# Patient Record
Sex: FEMALE | Race: WHITE | NOT HISPANIC OR LATINO
[De-identification: names, ages, dates, MRNs, and addresses within clinical notes are randomized per-mention and may not be internally consistent; named-entity substitution may affect disease eponyms.]

---

## 2021-05-19 PROBLEM — Z00.00 ENCOUNTER FOR PREVENTIVE HEALTH EXAMINATION: Status: ACTIVE | Noted: 2021-05-19

## 2021-05-20 ENCOUNTER — APPOINTMENT (OUTPATIENT)
Dept: OBGYN | Facility: CLINIC | Age: 51
End: 2021-05-20

## 2021-11-03 ENCOUNTER — NON-APPOINTMENT (OUTPATIENT)
Age: 51
End: 2021-11-03

## 2021-11-03 ENCOUNTER — APPOINTMENT (OUTPATIENT)
Dept: OBGYN | Facility: CLINIC | Age: 51
End: 2021-11-03
Payer: COMMERCIAL

## 2021-11-03 VITALS
TEMPERATURE: 97.2 F | SYSTOLIC BLOOD PRESSURE: 131 MMHG | HEART RATE: 88 BPM | WEIGHT: 175 LBS | BODY MASS INDEX: 29.88 KG/M2 | DIASTOLIC BLOOD PRESSURE: 87 MMHG | HEIGHT: 64 IN

## 2021-11-03 PROCEDURE — 99386 PREV VISIT NEW AGE 40-64: CPT

## 2021-11-03 NOTE — HISTORY OF PRESENT ILLNESS
[FreeTextEntry1] : here for routine visit\par last visit almost 4 years ago:\par \par Patient\par Name	JADIEL EDWARDS (48yo, F) ID# [id number]	Appt. Date/Time	2018 12:15PM\par 	1970	Service Dept.	[facility] OFF[facility]\par Provider	DEEP DUNN MD\par Insurance	\par Med Primary: ALLIED - [employer] (PPO)\par Insurance # : [id number]\par Prescription: DSTPS - Member is eligible. details\par Chief Complaint\par SONOGRAM, check on cyst\par Patient's Care Team\par Primary Care Provider: HOLDEN RITTER MD FACP: [address] [place] [id number], Ph [phone], Fax [phone] NPI: [id number]\par Patient's Pharmacies\par [id number] [address] [place] [id number], Ph [phone], Fax [phone]\par [id number] ([place]): [address] [place] [address], Ph [phone], Fax [phone]\par Vitals\par 2018 12:34 pm\par Ht:	5 ft 4 in\par BP:	120/78 sitting\par Allergies\par Reviewed Allergies\par NKDA\par Medications\par Reviewed Medications\par ALPRAZolam 0.5 mg tablet\par 14   filled	[facility]\par amoxicillin 875 mg-potassium clavulanate 125 mg tablet\par 14   filled	[facility]\par azithromycin 250 mg tablet\par 18   filled	[facility]\par brompheniramine-pseudoephedrine-DM 2 mg-30 mg-10 mg/5 mL syrup\par 18   filled	[facility]\par buPROPion HCl  mg 24 hr tablet, extended release\par 04/20/15   filled	[facility]\par Cheratussin AC 10 mg-100 mg/5 mL oral liquid\par 14   filled	[facility]\par hydroCHLOROthiazide 25 mg tablet\par 18   filled	[facility]\par hydrocodone 10 mg-chlorpheniramine 8 mg/5 mL oral susp extend.rel 12hr\par 18   filled	[facility]\par meloxicam 15 mg tablet\par 14   filled	[facility]\par Necon 1/35 (28) 1 mg-35 mcg tablet\par Take 1 tablet(s) every day by oral route for 28 days.\par 05/12/15   prescribed	Deep Dunn MD\par oseltamivir 75 mg capsule\par 18   filled	[facility]\par ProAir HFA 90 mcg/actuation aerosol inhaler\par 14   filled	[facility]\par valsartan 80 mg tablet\par 18   filled	[facility]\par Problems\par Reviewed Problems\par Pain in pelvis - Onset: 2017\par Amenorrhea - Onset: 2017\par Family history of neoplasm of breast - Onset: 2017\par Female genital organ symptoms\par Cyst of ovary\par Irregular periods\par Gynecologic examination\par Family History\par Reviewed Family History\par Mother	- Malignant tumor of breast ( age: 57)\par - needs breast specialist (previously recorded as Breast Cancer)\par Paternal Aunt	- Malignant tumor of ovary\par Father	- Heart disease\par - 2 STENTS\par Social History\par Reviewed Social History\par Smoking Status: Never smoker\par Surgical History\par Reviewed Surgical History\par Caesarean Section - 2\par Endometrial Ablation\par Tubal Ligation\par GYN History\par Reviewed GYN History\par LMP: Approximate.\par Date of LMP: 2018 (Notes: end of 2017 into 2017).\par Obstetric History\par Reviewed Obstetric History\par TOTAL	FULL	PRE	AB. I	AB. S	ECTOPICS	MULTIPLE	LIVING\par 2	2						2\par 2 cs\par \par Past Medical History\par Reviewed Past Medical History\par High Blood Pressure: Y\par Documents for Discussion\par Discussed the following documents:\par US, TRANSVAGINAL - 18\par \par Result Note: cyst gone\par Screening\par None recorded.\par HPI\par routine gyn exam\par ROS\par Patient reports no fatigue, no fever, no significant weight gain, and no significant weight loss. She reports no abnormal moles and no rashes. She reports no irritation and no vision changes. She reports no hearing loss, no ear pain, no nose/sinus problems, no sore throat, no snoring, no dry mouth, and no mouth ulcers. She reports no dyspnea / shortness of breath, no cough, no sputum production, no hemoptysis, and no wheezing. She reports no chest pain, no palpitations, and no orthopnea. She reports no heartburn, no dysphagia, no nausea, no vomiting, no abdominal pain, no bowel movement changes, no diarrhea, no constipation, and no rectal bleeding. She reports no hematuria, no abnormal bleeding, no flank pain, no trouble urinating, no incontinence, no rash, no lesion, no discharge, no vaginal odor, and no vaginal itching. She reports no menstrual problems and no PMDD symptoms. She reports no menopausal symptoms. She reports no sexual problems. She reports no muscle aches, no muscle weakness, no arthralgias/joint pain, and no back pain. She reports no headaches, no dizziness, no LOC, no weakness, no numbness, and no seizures. She reports no depression, no alcoholism, and no sleep disturbances.\par Physical Exam\par Patient is a 47-year-old female.\par \par Female Genitalia: Vulva: no masses, atrophy, or lesions. Bladder/Urethra: no urethral discharge or mass and normal meatus and bladder non distended. Vagina no tenderness, erythema, cystocele, rectocele, abnormal vaginal discharge, or vesicle(s) or ulcers. Cervix: no discharge or cervical motion tenderness and grossly normal. Uterus: normal size and shape and midline, mobile, non-tender, and no uterine prolapse. Adnexa/Parametria: no parametrial tenderness or mass and no adnexal tenderness or ovarian mass.\par Assessment / Plan\par 1. Family history of neoplasm of breast -\par MAMMO/SONO?\par \par Z84.89: Family history of other specified conditions\par PREGNANCY TEST, URINE\par BREAST SURGERY REFERRAL -     Schedule Within: provider's discretion\par \par 2. Cyst of left ovary -\par : NEGATIVE,\par REPEAT SONO 3 MTHS today...cyst gone\par \par N83.202: Unspecified ovarian cyst, left side\par US, TRANSVAGINAL\par \par US, TRANSVAGINAL\par Result Note: cyst gone\par Review of us, transvaginal taken on 2018 at IN-OFFICE ORDER shows:\par Imaging Studies:\par Indications: ovarian cyst.\par Uterus: volume (cc): 156.\par Endometrium: thickness 45 mm.\par Cervix: normal.\par Cul de sac: no fluid was demonstrated.\par Right Ovary: volume (cc): 1.9.\par Left Ovary: volume (cc): 1.7.\par \par Return to Office\par None recorded.\par Encounter Sign-Off\par Encounter signed-off by Deep Dunn MD, 2018.\par Encounter performed and documented by Deep Dunn MD\par Encounter reviewed & signed by Deep Dunn MD on 2018 at 12:56pm\par Audit history\par

## 2021-11-12 LAB
C TRACH RRNA SPEC QL NAA+PROBE: NOT DETECTED
HPV HIGH+LOW RISK DNA PNL CVX: NOT DETECTED
N GONORRHOEA RRNA SPEC QL NAA+PROBE: NOT DETECTED
SOURCE AMPLIFICATION: NORMAL

## 2021-11-25 LAB — CYTOLOGY CVX/VAG DOC THIN PREP: ABNORMAL

## 2021-11-29 ENCOUNTER — NON-APPOINTMENT (OUTPATIENT)
Age: 51
End: 2021-11-29

## 2022-01-18 ENCOUNTER — APPOINTMENT (OUTPATIENT)
Dept: OBGYN | Facility: CLINIC | Age: 52
End: 2022-01-18
Payer: COMMERCIAL

## 2022-01-18 VITALS
BODY MASS INDEX: 30.73 KG/M2 | HEIGHT: 64 IN | WEIGHT: 180 LBS | DIASTOLIC BLOOD PRESSURE: 80 MMHG | SYSTOLIC BLOOD PRESSURE: 126 MMHG | TEMPERATURE: 97.3 F | HEART RATE: 78 BPM

## 2022-01-18 PROCEDURE — 57452 EXAM OF CERVIX W/SCOPE: CPT

## 2022-01-18 NOTE — PROCEDURE
[Colposcopy] : Colposcopy  [Time out performed] : Pre-procedure time out performed.  Patient's name, date of birth and procedure confirmed. [Consent Obtained] : Consent obtained [Risks] : risks [Benefits] : benefits [Alternatives] : alternatives [Patient] : patient [Infection] : infection [Bleeding] : bleeding [Allergic Reaction] : allergic reaction [LGSIL] : LGSIL [No Premedication] : no premedication [No Abnormalities] : no abnormalities [Tolerated Well] : the patient tolerated the procedure well [de-identified] : colposcopy inadequate due to cervix pulled way up from her 2 csections....could not visualize it adequately...will need to be done under sedation [de-identified] : set up colpo with sedation

## 2022-01-27 ENCOUNTER — NON-APPOINTMENT (OUTPATIENT)
Age: 52
End: 2022-01-27

## 2022-02-01 DIAGNOSIS — Z01.818 ENCOUNTER FOR OTHER PREPROCEDURAL EXAMINATION: ICD-10-CM

## 2022-02-08 ENCOUNTER — APPOINTMENT (OUTPATIENT)
Dept: OBGYN | Facility: CLINIC | Age: 52
End: 2022-02-08
Payer: COMMERCIAL

## 2022-02-08 VITALS
BODY MASS INDEX: 32.61 KG/M2 | SYSTOLIC BLOOD PRESSURE: 110 MMHG | WEIGHT: 191 LBS | TEMPERATURE: 98.8 F | HEIGHT: 64 IN | DIASTOLIC BLOOD PRESSURE: 75 MMHG

## 2022-02-08 PROCEDURE — 57454 BX/CURETT OF CERVIX W/SCOPE: CPT

## 2022-02-08 NOTE — PROCEDURE
[Colposcopy] : Colposcopy  [Time out performed] : Pre-procedure time out performed.  Patient's name, date of birth and procedure confirmed. [Consent Obtained] : Consent obtained [Risks] : risks [Benefits] : benefits [Alternatives] : alternatives [Patient] : patient [Infection] : infection [Bleeding] : bleeding [Allergic Reaction] : allergic reaction [LGSIL] : LGSIL [No Premedication] : no premedication [Colposcopy Adequate] : colposcopy adequate [SCI Fully Visualized] : SCI fully visualized [ECC Performed] : ECC performed [No Abnormalities] : no abnormalities [Lesion] : lesion seen [Hemostasis Obtained] : Hemostasis obtained [Tolerated Well] : the patient tolerated the procedure well [de-identified] : we [de-identified] : 9/12/ecc [de-identified] : punch and ecc

## 2022-02-09 ENCOUNTER — NON-APPOINTMENT (OUTPATIENT)
Age: 52
End: 2022-02-09

## 2022-02-12 LAB — CORE LAB BIOPSY: NORMAL

## 2022-02-18 ENCOUNTER — NON-APPOINTMENT (OUTPATIENT)
Age: 52
End: 2022-02-18

## 2022-08-17 ENCOUNTER — APPOINTMENT (OUTPATIENT)
Dept: OBGYN | Facility: CLINIC | Age: 52
End: 2022-08-17

## 2022-08-17 ENCOUNTER — TRANSCRIPTION ENCOUNTER (OUTPATIENT)
Age: 52
End: 2022-08-17

## 2022-08-17 VITALS
TEMPERATURE: 98.8 F | SYSTOLIC BLOOD PRESSURE: 126 MMHG | DIASTOLIC BLOOD PRESSURE: 83 MMHG | BODY MASS INDEX: 30.73 KG/M2 | HEART RATE: 98 BPM | HEIGHT: 64 IN | WEIGHT: 180 LBS

## 2022-08-17 DIAGNOSIS — N91.2 AMENORRHEA, UNSPECIFIED: ICD-10-CM

## 2022-08-17 PROCEDURE — 99213 OFFICE O/P EST LOW 20 MIN: CPT

## 2022-08-25 ENCOUNTER — NON-APPOINTMENT (OUTPATIENT)
Age: 52
End: 2022-08-25

## 2022-08-25 LAB — HPV HIGH+LOW RISK DNA PNL CVX: NOT DETECTED

## 2022-09-06 LAB — CYTOLOGY CVX/VAG DOC THIN PREP: NORMAL

## 2022-11-09 ENCOUNTER — APPOINTMENT (OUTPATIENT)
Dept: OBGYN | Facility: CLINIC | Age: 52
End: 2022-11-09

## 2023-02-08 ENCOUNTER — APPOINTMENT (OUTPATIENT)
Dept: OBGYN | Facility: CLINIC | Age: 53
End: 2023-02-08
Payer: COMMERCIAL

## 2023-02-08 VITALS
BODY MASS INDEX: 30.73 KG/M2 | SYSTOLIC BLOOD PRESSURE: 124 MMHG | HEIGHT: 64 IN | DIASTOLIC BLOOD PRESSURE: 84 MMHG | WEIGHT: 180 LBS | HEART RATE: 80 BPM

## 2023-02-08 PROCEDURE — 99396 PREV VISIT EST AGE 40-64: CPT

## 2023-02-11 LAB — HPV HIGH+LOW RISK DNA PNL CVX: NOT DETECTED

## 2023-02-13 ENCOUNTER — NON-APPOINTMENT (OUTPATIENT)
Age: 53
End: 2023-02-13

## 2023-02-19 LAB — CYTOLOGY CVX/VAG DOC THIN PREP: NORMAL

## 2023-03-15 ENCOUNTER — NON-APPOINTMENT (OUTPATIENT)
Age: 53
End: 2023-03-15

## 2024-02-14 ENCOUNTER — APPOINTMENT (OUTPATIENT)
Dept: OBGYN | Facility: CLINIC | Age: 54
End: 2024-02-14
Payer: COMMERCIAL

## 2024-02-14 VITALS
BODY MASS INDEX: 33.8 KG/M2 | WEIGHT: 198 LBS | HEART RATE: 83 BPM | HEIGHT: 64 IN | SYSTOLIC BLOOD PRESSURE: 122 MMHG | DIASTOLIC BLOOD PRESSURE: 82 MMHG

## 2024-02-14 DIAGNOSIS — R87.612 LOW GRADE SQUAMOUS INTRAEPITHELIAL LESION ON CYTOLOGIC SMEAR OF CERVIX (LGSIL): ICD-10-CM

## 2024-02-14 DIAGNOSIS — Z91.89 OTHER SPECIFIED PERSONAL RISK FACTORS, NOT ELSEWHERE CLASSIFIED: ICD-10-CM

## 2024-02-14 DIAGNOSIS — Z01.419 ENCOUNTER FOR GYNECOLOGICAL EXAMINATION (GENERAL) (ROUTINE) W/OUT ABNORMAL FINDINGS: ICD-10-CM

## 2024-02-14 LAB
BILIRUB UR QL STRIP: NEGATIVE
CLARITY UR: CLEAR
COLLECTION METHOD: NORMAL
GLUCOSE UR-MCNC: NEGATIVE
HCG UR QL: 0.2 EU/DL
HGB UR QL STRIP.AUTO: NEGATIVE
KETONES UR-MCNC: NEGATIVE
LEUKOCYTE ESTERASE UR QL STRIP: NEGATIVE
NITRITE UR QL STRIP: NEGATIVE
PH UR STRIP: 7
PROT UR STRIP-MCNC: NEGATIVE
SP GR UR STRIP: 1.01

## 2024-02-14 PROCEDURE — 99396 PREV VISIT EST AGE 40-64: CPT | Mod: 25

## 2024-02-14 PROCEDURE — 81003 URINALYSIS AUTO W/O SCOPE: CPT | Mod: QW

## 2024-02-14 RX ORDER — HYDROCHLOROTHIAZIDE 12.5 MG/1
TABLET ORAL
Refills: 0 | Status: ACTIVE | COMMUNITY

## 2024-02-14 RX ORDER — METFORMIN HYDROCHLORIDE 625 MG/1
TABLET ORAL
Refills: 0 | Status: ACTIVE | COMMUNITY

## 2024-02-14 RX ORDER — ROSUVASTATIN CALCIUM 5 MG/1
TABLET, FILM COATED ORAL
Refills: 0 | Status: ACTIVE | COMMUNITY

## 2024-02-14 RX ORDER — TIRZEPATIDE 7.5 MG/.5ML
INJECTION, SOLUTION SUBCUTANEOUS
Refills: 0 | Status: ACTIVE | COMMUNITY

## 2024-02-17 LAB — HPV HIGH+LOW RISK DNA PNL CVX: NOT DETECTED

## 2024-02-23 LAB — CYTOLOGY CVX/VAG DOC THIN PREP: NORMAL

## 2024-04-23 ENCOUNTER — NON-APPOINTMENT (OUTPATIENT)
Age: 54
End: 2024-04-23

## 2024-05-14 ENCOUNTER — APPOINTMENT (OUTPATIENT)
Dept: HEMATOLOGY ONCOLOGY | Facility: CLINIC | Age: 54
End: 2024-05-14

## 2024-05-20 NOTE — DISCUSSION/SUMMARY
[FreeTextEntry1] : The visit was provided via telehealth using real-time 2-way audio visual technology. The patient, Taylor Duncan, was located in [place] at the time of the visit. The provider, Brandy Renteria (genetic counselor) participated in the telehealth encounter. The genetic counselor was located in [place], [place] at the time of the appointment. Verbal consent for telehealth services was given by the patient. The patient was unaccompanied.  REASON FOR VISIT: Ms. Taylor Duncan is a 53-year-old female who was referred by Dr. Deep Mistry for cancer genetic counseling and risk assessment due to a family history of cancer. The patient was seen on 2024 through [facility]. The patient was unaccompanied.   RELEVANT MEDICAL AND SURGICAL HISTORY: The patient reported no personal history of cancer.   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 Frequency: annual. No prior biopsies. GYN: Last visit 2024. Frequency: annual. Patient reported no abnormalities. Colon: Denied  Skin: Denied    SOCIAL HISTORY:  Tobacco-product use: Denied    FAMILY HISTORY: Paternal ancestry was reported as Stateless and maternal ancestry was reported as Stateless. A detailed family history of cancer was ascertained, see below for pedigree. Of note: - Mother with breast cancer at 58 -  Paternal aunt with ovarian cancer in her late 60s -  Paternal uncle with brain cancer in his mid-60s - Daughter tested negative BRCA1/2    The remaining family history is unremarkable. According to the patient there are no other known cases of significant cancers in the family. To her knowledge no one else in the family has had germline testing for cancer susceptibility.   RISK ASSESSMENT: Ms. Duncan's family history of cancer may be suggestive of a hereditary cancer susceptibility syndrome. Variants in breast cancer susceptibility genes were of specific concern.   We discussed the risks, benefits and limitations, financial cost and implications of genetic testing. We also discussed the psychosocial implications of genetic testing. Possible test results were reviewed with the patient, along with associated medical management options. The Genetic Information Non-discrimination Act (CHEMA) was also reviewed.   The patient was sent a consent form. Upon receipt of the consent, a saliva kit will be sent to the patient's home, which will then be sent to [facility] for analysis.    PLAN: 1. The patient opted to have a saliva kit sent to her home, which she will then send back to [facility] for analysis. 2. We will contact the patient once the results are available. Results generally return in 2-3 weeks.   For any additional questions please call Cancer Genetics at [phone] or [phone].     Brandy Renteria MS, [facility] [place] Genetic Counselor, Cancer Genetics

## 2024-06-11 ENCOUNTER — NON-APPOINTMENT (OUTPATIENT)
Age: 54
End: 2024-06-11

## 2024-06-11 NOTE — DISCUSSION/SUMMARY
[FreeTextEntry1] : REASON FOR VISIT: Ms. Taylor Duncan is a 53-year-old female who was called on 2024 for a discussion regarding her negative genetic test results related to hereditary cancer predisposition.   RELEVANT MEDICAL AND SURGICAL HISTORY: The patient reported no personal history of cancer.  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 Frequency: annual. No prior biopsies. GYN: Last visit 2024. Frequency: annual. Patient reported no abnormalities. Colon: Denied Skin: Denied  SOCIAL HISTORY:  Tobacco-product use: Denied  FAMILY HISTORY: Paternal ancestry was reported as Indian and maternal ancestry was reported as Indian. A detailed family history of cancer was ascertained, see below for pedigree. Of note: - Mother with breast cancer at 58 - Paternal aunt with ovarian cancer in her late 60s - Paternal uncle with brain cancer in his mid-60s - Daughter tested negative BRCA1/2  The remaining family history is unremarkable. According to the patient there are no other known cases of significant cancers in the family. To her knowledge no one else in the family has had germline testing for cancer susceptibility.   TEST RESULTS: NEGATIVE   NO pathogenic (disease-causing) variants or variants of uncertain significance were detected in the following genes: ADRIANA, BARD1, BRCA1, BRCA2, BRIP1, CDH1, CHEK2, DICER1, EPCAM, MLH1, MSH2, MSH6, NF1, PALB2, PMS2, PTEN, RAD51C, RAD51D, SMARCA4, STK11, TP53    RESULTS INTERPRETATION AND ASSESSMENT: Given Ms. Duncan's current reported family history of cancer and her negative genetic test results, in the absence of other indications, the patient should practice age-appropriate cancer screening for all organ systems as recommended for the general population.   It is recommended that the patient discuss the importance of pursuing cancer genetic testing and counseling with her other relatives. There may be a pathogenic variant in the family which the patient did not inherit. We would be happy to meet with her family members or refer them to a genetic expert in their area.   We also discussed that, while no clear cause of the patient's personal and family history of cancer was identified, this result, while reassuring, does not entirely rule out a hereditary cancer risk in the patient. It is possible the patient has a mutation in one of the genes tested that is not detectable by this analysis, or has a mutation in a different gene, either known or unknown. It is also possible there is a hereditary cancer predisposition in the family, but the patient did not inherit it.   Our knowledge of genetics and inherited cancer conditions is changing rapidly, therefore, we recommend that the patient contact our office, every 2 to 3 years, to discuss relevant advances in cancer genetics. We emphasize the importance of re-contacting us with updates regarding her personal and family history of cancer as well as any updates regarding additional cancer genetic test results performed for the patient and/or family members.  Such updates could possibly change our risk assessment and recommendations.   PLAN: 1. Long-term management and surveillance should be based on the patient's personal and family history and general population guidelines for all other cancers. 2. A copy of the genetic test results is available to the patient in the [facility] portal. 3. The patient was encouraged to contact us every 2-3 years to discuss relevant advances in cancer genetics, or sooner if there are any changes in her personal or family history of cancer.   For any additional questions please call Cancer Genetics at [phone] or [phone].   Brandy Renteria MS, [facility] [place]  Genetic Counselor, Cancer Genetics

## 2025-02-21 DIAGNOSIS — Z12.39 ENCOUNTER FOR OTHER SCREENING FOR MALIGNANT NEOPLASM OF BREAST: ICD-10-CM

## 2025-02-26 ENCOUNTER — APPOINTMENT (OUTPATIENT)
Dept: OBGYN | Facility: CLINIC | Age: 55
End: 2025-02-26
Payer: COMMERCIAL

## 2025-02-26 VITALS
BODY MASS INDEX: 31.58 KG/M2 | HEIGHT: 64 IN | HEART RATE: 109 BPM | SYSTOLIC BLOOD PRESSURE: 130 MMHG | DIASTOLIC BLOOD PRESSURE: 83 MMHG | WEIGHT: 185 LBS

## 2025-02-26 DIAGNOSIS — Z80.3 FAMILY HISTORY OF MALIGNANT NEOPLASM OF BREAST: ICD-10-CM

## 2025-02-26 DIAGNOSIS — Z01.419 ENCOUNTER FOR GYNECOLOGICAL EXAMINATION (GENERAL) (ROUTINE) W/OUT ABNORMAL FINDINGS: ICD-10-CM

## 2025-02-26 DIAGNOSIS — N95.1 MENOPAUSAL AND FEMALE CLIMACTERIC STATES: ICD-10-CM

## 2025-02-26 DIAGNOSIS — R87.612 LOW GRADE SQUAMOUS INTRAEPITHELIAL LESION ON CYTOLOGIC SMEAR OF CERVIX (LGSIL): ICD-10-CM

## 2025-02-26 DIAGNOSIS — Z80.41 FAMILY HISTORY OF MALIGNANT NEOPLASM OF OVARY: ICD-10-CM

## 2025-02-26 PROCEDURE — 99459 PELVIC EXAMINATION: CPT

## 2025-02-26 PROCEDURE — 99386 PREV VISIT NEW AGE 40-64: CPT

## 2025-02-26 RX ORDER — ESTRADIOL AND NORETHINDRONE ACETATE .5; .1 MG/1; MG/1
0.5-0.1 TABLET, FILM COATED ORAL DAILY
Qty: 3 | Refills: 3 | Status: ACTIVE | COMMUNITY
Start: 2025-02-26 | End: 1900-01-01

## 2025-02-28 LAB — HPV HIGH+LOW RISK DNA PNL CVX: NOT DETECTED

## 2025-03-03 ENCOUNTER — NON-APPOINTMENT (OUTPATIENT)
Age: 55
End: 2025-03-03

## 2025-03-06 LAB — CYTOLOGY CVX/VAG DOC THIN PREP: NORMAL

## 2025-03-07 RX ORDER — ESTRADIOL/NORETHINDRONE ACETATE TRANSDERMAL SYSTEM .05; .14 MG/D; MG/D
0.05-0.14 PATCH, EXTENDED RELEASE TRANSDERMAL
Qty: 16 | Refills: 2 | Status: ACTIVE | COMMUNITY
Start: 2025-03-05 | End: 1900-01-01